# Patient Record
Sex: MALE | Employment: UNEMPLOYED | URBAN - METROPOLITAN AREA
[De-identification: names, ages, dates, MRNs, and addresses within clinical notes are randomized per-mention and may not be internally consistent; named-entity substitution may affect disease eponyms.]

---
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
## 2025-05-27 NOTE — PROGRESS NOTES
History: Martin is here for his right knee. He feels that he injured the knee in January while wrestling when his knee got twisted awkwardly. He did some home exercise and it got better. He then did a 5k last week which caused it to become painful again. It does continue to be sore and painful. He is here as a new patient today.     Past medical history: Multiple  Medications: Multiple  Allergies: No known drug allergies    Please refer to the intake H&P regarding the patient's review of systems, family history and social history as was done today    HEENT: Normal  Lungs: Clear to auscultation  Heart: RRR  Abdomen: Soft, nontender  Skin: clear  Extremity: He has full range of motion of the knee with trace crepitus.  Negative Lachman and posterior drawer.  Positive posterior Sonny's and over.  Mild laxity with varus stress.  No numbness or tingling.  Contralateral exam is normal for strength, motion, stability and neurovascular assessment.    Radiographs: Right knee x-rays today are essentially normal.      Assessment: Right knee sprain cannot rule out internal derangement    Plan: We discussed several options for treatment including medications and further imaging with a MRI for surgical planning purposes. We will put in an order for the MRI today and will see him back afterwards to discuss the results. In the meantime, he will try to avoid any activity that will aggravate the knee.    All questions were answered today with the patient.    Scribe Attestation:  By signing my name below, IMaryann Scribe attest that this documentation has been prepared under the direction and in the presence of Gaurav Raphael MD.    Provider Attestation - Scribe documentation:  All medical record entries made by Maryann Nathan were at my direction and personally dictated by me, Gaurav Raphael MD. I have reviewed the chart and agree that the record is accurate and I confirmed that it reflects my personal  performance of the history, physical exam, discussion, and plan.

## 2025-05-28 ENCOUNTER — HOSPITAL ENCOUNTER (OUTPATIENT)
Dept: RADIOLOGY | Facility: HOSPITAL | Age: 20
Discharge: HOME | End: 2025-05-28
Payer: COMMERCIAL

## 2025-05-28 ENCOUNTER — APPOINTMENT (OUTPATIENT)
Dept: ORTHOPEDIC SURGERY | Facility: CLINIC | Age: 20
End: 2025-05-28
Payer: COMMERCIAL

## 2025-05-28 DIAGNOSIS — M25.561 RIGHT KNEE PAIN, UNSPECIFIED CHRONICITY: ICD-10-CM

## 2025-05-28 DIAGNOSIS — S83.91XA SPRAIN OF RIGHT KNEE, INITIAL ENCOUNTER: Primary | ICD-10-CM

## 2025-05-28 PROCEDURE — 73564 X-RAY EXAM KNEE 4 OR MORE: CPT | Mod: RT

## 2025-05-28 PROCEDURE — 1036F TOBACCO NON-USER: CPT | Performed by: ORTHOPAEDIC SURGERY

## 2025-05-28 PROCEDURE — 73564 X-RAY EXAM KNEE 4 OR MORE: CPT | Mod: RIGHT SIDE | Performed by: RADIOLOGY

## 2025-05-28 PROCEDURE — 99204 OFFICE O/P NEW MOD 45 MIN: CPT | Performed by: ORTHOPAEDIC SURGERY

## 2025-06-18 ENCOUNTER — APPOINTMENT (OUTPATIENT)
Dept: RADIOLOGY | Facility: HOSPITAL | Age: 20
End: 2025-06-18
Payer: COMMERCIAL

## 2025-06-18 DIAGNOSIS — S83.91XA SPRAIN OF RIGHT KNEE, INITIAL ENCOUNTER: ICD-10-CM

## 2025-06-18 PROCEDURE — 73721 MRI JNT OF LWR EXTRE W/O DYE: CPT | Mod: RT

## 2025-07-24 ENCOUNTER — OFFICE VISIT (OUTPATIENT)
Dept: ORTHOPEDIC SURGERY | Facility: CLINIC | Age: 20
End: 2025-07-24
Payer: COMMERCIAL

## 2025-07-24 ENCOUNTER — ANCILLARY PROCEDURE (OUTPATIENT)
Dept: ORTHOPEDIC SURGERY | Facility: CLINIC | Age: 20
End: 2025-07-24
Payer: COMMERCIAL

## 2025-07-24 DIAGNOSIS — M54.10 BACK PAIN WITH RADICULOPATHY: ICD-10-CM

## 2025-07-24 DIAGNOSIS — M54.10 BACK PAIN WITH RADICULOPATHY: Primary | ICD-10-CM

## 2025-07-24 PROCEDURE — 99214 OFFICE O/P EST MOD 30 MIN: CPT | Performed by: PHYSICIAN ASSISTANT

## 2025-07-24 PROCEDURE — 1036F TOBACCO NON-USER: CPT | Performed by: PHYSICIAN ASSISTANT

## 2025-07-24 RX ORDER — CYCLOBENZAPRINE HCL 10 MG
10 TABLET ORAL 3 TIMES DAILY PRN
Qty: 30 TABLET | Refills: 0 | Status: SHIPPED | OUTPATIENT
Start: 2025-07-24 | End: 2025-08-03

## 2025-07-24 RX ORDER — PREDNISONE 10 MG/1
TABLET ORAL
Qty: 30 TABLET | Refills: 0 | Status: SHIPPED | OUTPATIENT
Start: 2025-07-24

## 2025-07-24 NOTE — PROGRESS NOTES
New patient established to the practice comes the office complaining of low back pain.  It started while he was working out lifting weights.  Doing a dead lift.  And then he had an episode where he got some right leg sciatica symptoms.  Not really at now.  Denies bowel or bladder complaints denies saddle anesthesia denies gait or balance changes.  Denies any spine surgery.    Look at patient's primary doctors note check medication list is not on any meds to cause any muscular aches and pains.  No major blood thinners.    Physical exam: Well-nourished, well kept.  No lymphangitis or lymphadenopathy in the examined extremities.  Good perfusion to the extremities ×4.  Radial and dorsalis pedis pulses 2+.  Capillary refill to all 4 digits brisk.  No distal edema x 4.  Ambulating with no major difficulty.  Full range of motion cervical spine and observation no instability.  Decreased range of motion flexion-extension rotation lumbar spine good strength no instability.  Patient moving upper extremities by difficulty motor strength intact.  Examination of the lower extremities reveals no point tenderness, swelling, or deformity.  Range of motion of the hips, knees, and ankles are full without crepitance, instability, or exacerbation of pain.  Strength is 5/5 throughout.  No redness, abrasions, or lesions on all 4 extremities, head and neck, or trunk.  Patient neurologically intact    X-ray: X-ray lumbar spine taken today and reviewed shows very minor degenerative changes.  Nothing bad though no fractures dislocations or bony lytic lesions noted no instability on flexion-extension.    Assessment: Is a patient probably with some lumbar sprain strain I think.  I talked about the possibility maybe it was stress reaction.  But I told him we can start with conservative treatment.    Plan: Will get the patient into physical therapy here in [place].  And we will try some steroid muscle relaxants.  If he is no better and the pain  is lingering on or getting worse with therapy let me know and I will get an MRI of the lumbar spine to rule out a stress fracture

## 2025-07-30 NOTE — PROGRESS NOTES
"Physical Therapy    Physical Therapy Evaluation and Treatment      Patient Name: Martin Baker  MRN: [id number]  Today's Date: 7/31/2025    Time Entry:   Time Calculation  Start Time: 0719  Stop Time: 0802  Time Calculation (min): 43 min  PT Evaluation Time Entry  PT Evaluation (Low) Time Entry: 30  PT Therapeutic Procedures Time Entry  Therapeutic Exercise Time Entry: 13                   Assessment:  PT Assessment  PT Assessment Results: Decreased strength, Decreased range of motion, Decreased endurance, Decreased mobility, Decreased coordination, Impaired balance, Pain  Rehab Prognosis: Good     low complexity, stable    Patient is a 19 year old male that presents to physical therapy evaluation today due to complaints of low back pain. Pt reports he initially injured it when dead lifting.  Pt has the presence of 1 personal factors and/or comorbidities that impact the plan of care including chronicity of symptoms.    Patient demonstrates a decline in their functional mobility secondary to pain,flexibility deficits of the hamstring, strength deficits in core and pelvic musculature, and motor control deficits including SL stance  with poor intrinsic foot control, over-pronation, tibial internal rotation with knee valgus collapse in 8\" step down tests.   Due to these impairments, the patients has the following functional limitations: pain with dead lifting, difficulty participating in higher level sport activities such as running, jumping and cutting. Pt will benefit from continued skilled therapy to address their impairments and progress towards the associated functional goals.    Plan:  OP PT Plan  Treatment/Interventions: Aquatic therapy, Biofeedback, Blood flow restriction therapy, Cryotherapy, Dry needling, Education/ Instruction, Electrical stimulation, Gait training, Hot pack, Manual therapy, Neuromuscular re-education, Mechanical traction, Self care/ home management, Taping techniques, Therapeutic " "activities, Therapeutic exercises, Ultrasound, Vasopneumatic device  PT Plan: Skilled PT  PT Frequency: 1 time per week  Duration: 1-2x a week for 6 more visits  Number of Treatments Authorized: BMN  Rehab Potential: Good  Plan of Care Agreement: Patient    Current Problem:   1. Acute back pain with radiculopathy  Follow Up In Physical Therapy      2. Weakness of both hips  Follow Up In Physical Therapy      3. Decreased ROM of lumbar spine  Follow Up In Physical Therapy          Insurance   [employer] BMN PT OT COPAY 0 DED (MET)  COVERAGE 100 OOP 1000(40) 3000(480) NO AUTH REQ  REF# [id number]   Visit: 1     Subjective      Pt reports he was doing a dead lift while working out and had an episode where he got right leg sciatica type symptoms. He reports this was in spring.   Pt saw Efren Samuels on 7/24/25 and he prescribed him some steriod and muscle relaxants to try in addition to physical therapy.     He feels like he had gotten to the point were he could still try to workout but wasn't able to lift heavier weights. He reports he enjoys playing soccer and basketball and notices when jumping/cutting activities will aggravate his back.     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     Pt reports he typically works out on his own ans splits up his workouts by body part.     Pt denies any numbness/tingling in the legs currently.     Pain:     Location: lower back   Description: sharp pain across lower back, maybe leaning towards the right side  Worst: 5/10  Best: 0/10  Current: 0/10  Aggravating Factors:  cutting, jumping, heavier lifts  Relieving Factors:  heating pad    Pt goals: \" get back to dead lifting again\"     Relevant Information (PMH & Previous Tests/Imaging):     PMH: unremarkable    Imaging:   X-rays:FINDINGS:  AP lateral flexion extension x-rays of the lumbar spine shows minimal  degenerative changes with some endplate irregularity. There is no  spondylolisthesis. There " is no spondylolysis. There is no fractures.  Lumbar lordosis is maintained. Range of motion with flexion extension  is preserved. There is no scoliosis. Pedicles are visualized at all  levels. Bony pelvis and hips are partially visualized and are normal.     Previous Interventions/Treatments: n/a    Red Flags: Do you have any of the following? NO  Fever/chills, unexplained weight changes, dizziness/fainting, unexplained change in bowel or bladder functions, unexplained malaise or muscle weakness, night pain/sweats, numbness or tingling  Signs of DVT including: Pain, swelling or tenderness to calf, warm or red skin, previous history of DVT    General:  General  Reason for Referral: low back  Referred By: Efren Samuels  Precautions:  N/a     Pain:  Pain Assessment  Pain Assessment: 0-10  0-10 (Numeric) Pain Score: 0 - No pain  Pain Type: Chronic pain  Pain Location: Back  Pain Orientation: Lower  Home Living:   Pt is a college student, apartment 12 steps to enter handrail present   Prior Level of Function:   Pt previously independent with all ADLs    Objective     Spine Musculoskeletal Exam    Gait    Gait is normal.    Palpation    Thoracolumbar    Tenderness: none    Right      Spasms: none      Muscle tone: normal    Left      Spasms: none      Muscle tone: normal    Range of Motion    Thoracolumbar       Flexion: 75%. Flexion comment: 1/10, mostly tightness in lower back and hamstring. Flexion detail: pain.     Extension: normal. Extension detail: no pain.       Right      Lateral bending: normal. Lateral bending detail: no pain.       Lateral rotation: normal. Lateral rotation detail: no pain.       Left      Lateral bending: normal. Lateral bending detail: no pain.       Lateral rotation: normal. Lateral rotation detail: no pain.      Strength    Thoracolumbar       Right      Tibialis anterior: 5/5.       Plantar flexion: 5/5.       Quadriceps: 5/5.       Hamstrin/5.       Hip abductors: 4-/5.       Hip  "flexion: 4-/5. Hip flexion is not affected by pain.        Left      Tibialis anterior: 5/5.       Plantar flexion: 5/5.       Quadriceps: 5/5.       Hamstrin/5.       Hip abductors: 4-/5.       Hip flexion: 4-/5. Hip flexion is not affected by pain.      Special Tests    Thoracolumbar      Right      AUTUMN test: negative      SLR: no back or leg pain      Left      AUTUMN test: negative      SLR: no back or leg pain    Hip Musculoskeletal Exam  Gait    Gait is normal.    Range of Motion    Right      Right hip range of motion is within functional limits.       Passive ROM comment: some tightness in hip IR.     Left      Left hip range of motion is within functional limits.       Passive ROM comment: some tightness in hip IR.     Strength    Right      Extension: 4-/5. Extension is not affected by pain.       Flexion: 4-/5. Flexion is not affected by pain.     Left      Extension: 4-/5. Extension is not affected by pain.       Flexion: 4-/5. Flexion is not affected by pain.     Special Tests    Right      AUTUMN test (right): negative    Left      AUTUMN test (left): negative       DL bridge test: bilateral pelvic drop noted and shaking in hamstrings     8\" lateral clarke down test: bilateral Poor intrinsic foot control, over-pronation, tibial internal rotation with knee valgus collapse    Forearm plank hold: able to sustain for 30 seconds, no increase in pain     DL squat: forward trunk lean, decreased ankle DF ROM noted by bilaterally heel lift at end range- no increase in pain    Outcome Measures:  Other Measures  Oswestry Disablity Index (ENDER):      Treatments:  Therapeutic Exercise  Therapeutic Exercise Performed: Yes  Therapeutic Exercise Activity 1: sidelying clamshells green x10  Therapeutic Exercise Activity 2: DL bridge x10 green band at knees  Therapeutic Exercise Activity 3: side plank holds x1 30\" each side  Therapeutic Exercise Activity 4: bird dog progressions x5 5\" hold    EDUCATION:  Outpatient " Education  Individual(s) Educated: Patient  Education Provided: Anatomy, Body Mechanics, Home Exercise Program, POC, Physiology  Risk and Benefits Discussed with Patient/Caregiver/Other: yes  Patient/Caregiver Demonstrated Understanding: yes  Plan of Care Discussed and Agreed Upon: yes  Patient Response to Education: Patient/Caregiver Verbalized Understanding of Information  The patient was educated on: the importance of positioning, proper posture, and body mechanics, joint mechanics and pathology, general tissue healing time, the appropriate use of heat and cold to control pain and inflammation, the importance of general therapeutic exercise, especially to stay within pain-free ROM, specific anatomy, function, & regional interdependence of involved areas, & likely cause of impairments & POC. Pt's questions were answered to their satisfaction, & pt. verbalized understanding & agreement with POC    Access Code: [email or web address]  URL: [email or web address]  Date: 07/31/2025  Prepared by: Iqra Ga    Goals:  Pain: Pt to report pain at worst 1/10 in order to improve functional capacity.   Range Of Motion/Joint Mobility: Lumbar AROM WFL's and pain free.  Strength: 4+-5/5 gross strength throughout lumbopelvic musculature in order to improve function for leisure / recreation skills.   No pain or difficulty with their basic and instrumental ADL's.   Pt to be able to participating in dead lifting activities with gradual increase in weight without increase in symptoms

## 2025-07-31 ENCOUNTER — APPOINTMENT (OUTPATIENT)
Dept: PHYSICAL THERAPY | Facility: CLINIC | Age: 20
End: 2025-07-31
Payer: COMMERCIAL

## 2025-07-31 DIAGNOSIS — R29.898 WEAKNESS OF BOTH HIPS: ICD-10-CM

## 2025-07-31 DIAGNOSIS — M54.10 ACUTE BACK PAIN WITH RADICULOPATHY: Primary | ICD-10-CM

## 2025-07-31 DIAGNOSIS — M53.86 DECREASED ROM OF LUMBAR SPINE: ICD-10-CM

## 2025-07-31 PROCEDURE — 97110 THERAPEUTIC EXERCISES: CPT | Performed by: PHYSICAL THERAPIST

## 2025-07-31 PROCEDURE — 97161 PT EVAL LOW COMPLEX 20 MIN: CPT | Performed by: PHYSICAL THERAPIST

## 2025-07-31 ASSESSMENT — ENCOUNTER SYMPTOMS
DEPRESSION: 0
OCCASIONAL FEELINGS OF UNSTEADINESS: 0
LOSS OF SENSATION IN FEET: 0

## 2025-07-31 ASSESSMENT — PAIN - FUNCTIONAL ASSESSMENT: PAIN_FUNCTIONAL_ASSESSMENT: 0-10

## 2025-07-31 ASSESSMENT — PAIN SCALES - GENERAL: PAINLEVEL_OUTOF10: 0 - NO PAIN

## 2025-07-31 NOTE — PATIENT INSTRUCTIONS
Access Code: [email or web address]  URL: [email or web address]  Date: 07/31/2025  Prepared by: Iqar Ga    Exercises  - Clamshell with Resistance  - 1 x daily - 7 x weekly - 3 sets - 10 reps - 2 hold  - Supine Bridge with Resistance Band  - 1 x daily - 7 x weekly - 3 sets - 10 reps - 3 hold  - Side Plank on Knees  - 1 x daily - 7 x weekly - 3 sets - 30sec to 1 min hold  - Bird Dog  - 1 x daily - 7 x weekly - 2 sets - 10 reps - 5 hold

## 2025-08-05 ENCOUNTER — HOSPITAL ENCOUNTER (EMERGENCY)
Age: 20
Discharge: HOME | End: 2025-08-05
Attending: EMERGENCY MEDICINE
Payer: COMMERCIAL

## 2025-08-05 ENCOUNTER — OFFICE VISIT (OUTPATIENT)
Dept: FAMILY MEDICINE CLINIC | Age: 20
End: 2025-08-05
Payer: COMMERCIAL

## 2025-08-05 VITALS
BODY MASS INDEX: 24.39 KG/M2 | DIASTOLIC BLOOD PRESSURE: 64 MMHG | WEIGHT: 170.4 LBS | SYSTOLIC BLOOD PRESSURE: 112 MMHG | OXYGEN SATURATION: 98 % | TEMPERATURE: 97.9 F | HEART RATE: 73 BPM | RESPIRATION RATE: 16 BRPM | HEIGHT: 70 IN

## 2025-08-05 VITALS
SYSTOLIC BLOOD PRESSURE: 132 MMHG | TEMPERATURE: 97.9 F | OXYGEN SATURATION: 97 % | RESPIRATION RATE: 20 BRPM | HEART RATE: 66 BPM | WEIGHT: 170 LBS | DIASTOLIC BLOOD PRESSURE: 64 MMHG | BODY MASS INDEX: 24.34 KG/M2 | HEIGHT: 70 IN

## 2025-08-05 DIAGNOSIS — L25.9 CONTACT DERMATITIS, UNSPECIFIED CONTACT DERMATITIS TYPE, UNSPECIFIED TRIGGER: Primary | ICD-10-CM

## 2025-08-05 DIAGNOSIS — R21 RASH: Primary | ICD-10-CM

## 2025-08-05 LAB
ANION GAP SERPL CALCULATED.3IONS-SCNC: 12 MEQ/L (ref 9–15)
BASOPHILS # BLD: 0.1 K/UL (ref 0–0.1)
BASOPHILS NFR BLD: 0.8 % (ref 0.2–1.2)
BUN SERPL-MCNC: 18 MG/DL (ref 6–20)
CALCIUM SERPL-MCNC: 9.6 MG/DL (ref 8.5–9.9)
CHLORIDE SERPL-SCNC: 103 MEQ/L (ref 95–107)
CO2 SERPL-SCNC: 24 MEQ/L (ref 20–31)
CREAT SERPL-MCNC: 0.9 MG/DL (ref 0.7–1.2)
EOSINOPHIL # BLD: 0.9 K/UL (ref 0–0.5)
EOSINOPHIL NFR BLD: 12.2 % (ref 0.8–7)
ERYTHROCYTE [DISTWIDTH] IN BLOOD BY AUTOMATED COUNT: 12.9 % (ref 11.6–14.4)
GLUCOSE SERPL-MCNC: 71 MG/DL (ref 70–99)
HCT VFR BLD AUTO: 47.9 % (ref 42–52)
HGB BLD-MCNC: 16.4 G/DL (ref 13.7–17.5)
IMM GRANULOCYTES # BLD: 0 K/UL
IMM GRANULOCYTES NFR BLD: 0.1 %
LYMPHOCYTES # BLD: 1.7 K/UL (ref 1.3–3.6)
LYMPHOCYTES NFR BLD: 23 %
MCH RBC QN AUTO: 30.4 PG (ref 25.7–32.2)
MCHC RBC AUTO-ENTMCNC: 34.2 % (ref 32.3–36.5)
MCV RBC AUTO: 88.7 FL (ref 79–92.2)
MONOCYTES # BLD: 0.6 K/UL (ref 0.3–0.8)
MONOCYTES NFR BLD: 7.7 % (ref 5.3–12.2)
NEUTROPHILS # BLD: 4.2 K/UL (ref 1.8–5.4)
NEUTS SEG NFR BLD: 56.2 % (ref 34–67.9)
PLATELET # BLD AUTO: 221 K/UL (ref 163–337)
POTASSIUM SERPL-SCNC: 4.5 MEQ/L (ref 3.4–4.9)
RBC # BLD AUTO: 5.4 M/UL (ref 4.63–6.08)
SODIUM SERPL-SCNC: 139 MEQ/L (ref 135–144)
WBC # BLD AUTO: 7.4 K/UL (ref 4.2–9)

## 2025-08-05 PROCEDURE — 87040 BLOOD CULTURE FOR BACTERIA: CPT

## 2025-08-05 PROCEDURE — 80048 BASIC METABOLIC PNL TOTAL CA: CPT

## 2025-08-05 PROCEDURE — 99283 EMERGENCY DEPT VISIT LOW MDM: CPT

## 2025-08-05 PROCEDURE — 6370000000 HC RX 637 (ALT 250 FOR IP): Performed by: EMERGENCY MEDICINE

## 2025-08-05 PROCEDURE — 85025 COMPLETE CBC W/AUTO DIFF WBC: CPT

## 2025-08-05 PROCEDURE — 99202 OFFICE O/P NEW SF 15 MIN: CPT

## 2025-08-05 PROCEDURE — 36415 COLL VENOUS BLD VENIPUNCTURE: CPT

## 2025-08-05 RX ORDER — PREDNISONE 20 MG/1
40 TABLET ORAL DAILY
Qty: 8 TABLET | Refills: 0 | Status: SHIPPED | OUTPATIENT
Start: 2025-08-05 | End: 2025-08-09

## 2025-08-05 RX ORDER — SULFAMETHOXAZOLE AND TRIMETHOPRIM 800; 160 MG/1; MG/1
TABLET ORAL
COMMUNITY
Start: 2025-07-31

## 2025-08-05 RX ORDER — CEPHALEXIN 500 MG/1
500 CAPSULE ORAL 4 TIMES DAILY
Qty: 28 CAPSULE | Refills: 0 | Status: SHIPPED | OUTPATIENT
Start: 2025-08-05 | End: 2025-08-12

## 2025-08-05 RX ORDER — PREDNISONE 20 MG/1
40 TABLET ORAL ONCE
Status: COMPLETED | OUTPATIENT
Start: 2025-08-05 | End: 2025-08-05

## 2025-08-05 RX ORDER — CEPHALEXIN 500 MG/1
500 CAPSULE ORAL ONCE
Status: COMPLETED | OUTPATIENT
Start: 2025-08-05 | End: 2025-08-05

## 2025-08-05 RX ADMIN — CEPHALEXIN 500 MG: 500 CAPSULE ORAL at 13:46

## 2025-08-05 RX ADMIN — PREDNISONE 40 MG: 20 TABLET ORAL at 13:46

## 2025-08-05 SDOH — ECONOMIC STABILITY: FOOD INSECURITY: WITHIN THE PAST 12 MONTHS, THE FOOD YOU BOUGHT JUST DIDN'T LAST AND YOU DIDN'T HAVE MONEY TO GET MORE.: NEVER TRUE

## 2025-08-05 SDOH — ECONOMIC STABILITY: FOOD INSECURITY: WITHIN THE PAST 12 MONTHS, YOU WORRIED THAT YOUR FOOD WOULD RUN OUT BEFORE YOU GOT MONEY TO BUY MORE.: NEVER TRUE

## 2025-08-05 ASSESSMENT — ENCOUNTER SYMPTOMS
VOMITING: 0
ABDOMINAL PAIN: 0
RHINORRHEA: 0
SHORTNESS OF BREATH: 0
NAUSEA: 0
FACIAL SWELLING: 0
SHORTNESS OF BREATH: 0
SORE THROAT: 0
SORE THROAT: 0
WHEEZING: 0
COUGH: 0
EYE PAIN: 0
COUGH: 0
DIARRHEA: 0
EYE DISCHARGE: 0
COLOR CHANGE: 1
ABDOMINAL PAIN: 0
EYE REDNESS: 0
VOMITING: 0
SINUS PRESSURE: 0
NAIL CHANGES: 0
NAUSEA: 0
BACK PAIN: 0
CHEST TIGHTNESS: 0
ABDOMINAL DISTENTION: 0
SINUS PAIN: 0

## 2025-08-05 ASSESSMENT — PATIENT HEALTH QUESTIONNAIRE - PHQ9
1. LITTLE INTEREST OR PLEASURE IN DOING THINGS: NOT AT ALL
SUM OF ALL RESPONSES TO PHQ QUESTIONS 1-9: 0
SUM OF ALL RESPONSES TO PHQ QUESTIONS 1-9: 0
2. FEELING DOWN, DEPRESSED OR HOPELESS: NOT AT ALL
SUM OF ALL RESPONSES TO PHQ QUESTIONS 1-9: 0
SUM OF ALL RESPONSES TO PHQ QUESTIONS 1-9: 0

## 2025-08-05 ASSESSMENT — PAIN - FUNCTIONAL ASSESSMENT
PAIN_FUNCTIONAL_ASSESSMENT: NONE - DENIES PAIN
PAIN_FUNCTIONAL_ASSESSMENT: NONE - DENIES PAIN

## 2025-08-05 ASSESSMENT — LIFESTYLE VARIABLES
HOW MANY STANDARD DRINKS CONTAINING ALCOHOL DO YOU HAVE ON A TYPICAL DAY: 1 OR 2
HOW OFTEN DO YOU HAVE A DRINK CONTAINING ALCOHOL: MONTHLY OR LESS

## 2025-08-07 ENCOUNTER — APPOINTMENT (OUTPATIENT)
Dept: PHYSICAL THERAPY | Facility: CLINIC | Age: 20
End: 2025-08-07
Payer: COMMERCIAL

## 2025-08-10 LAB — BACTERIA BLD CULT: NORMAL

## 2025-08-12 ENCOUNTER — APPOINTMENT (OUTPATIENT)
Dept: PHYSICAL THERAPY | Facility: CLINIC | Age: 20
End: 2025-08-12
Payer: COMMERCIAL

## 2025-08-14 ENCOUNTER — APPOINTMENT (OUTPATIENT)
Dept: PHYSICAL THERAPY | Facility: CLINIC | Age: 20
End: 2025-08-14
Payer: COMMERCIAL

## 2025-08-14 DIAGNOSIS — R29.898 WEAKNESS OF BOTH HIPS: ICD-10-CM

## 2025-08-14 DIAGNOSIS — M53.86 DECREASED ROM OF LUMBAR SPINE: ICD-10-CM

## 2025-08-14 DIAGNOSIS — M54.10 ACUTE BACK PAIN WITH RADICULOPATHY: ICD-10-CM

## 2025-08-14 PROCEDURE — 97110 THERAPEUTIC EXERCISES: CPT | Performed by: PHYSICAL THERAPIST

## 2025-08-14 ASSESSMENT — PAIN - FUNCTIONAL ASSESSMENT: PAIN_FUNCTIONAL_ASSESSMENT: 0-10

## 2025-08-14 ASSESSMENT — PAIN SCALES - GENERAL: PAINLEVEL_OUTOF10: 0 - NO PAIN

## 2025-08-18 ENCOUNTER — APPOINTMENT (OUTPATIENT)
Dept: PHYSICAL THERAPY | Facility: CLINIC | Age: 20
End: 2025-08-18
Payer: COMMERCIAL

## 2025-08-18 DIAGNOSIS — M54.10 ACUTE BACK PAIN WITH RADICULOPATHY: ICD-10-CM

## 2025-08-18 DIAGNOSIS — R29.898 WEAKNESS OF BOTH HIPS: ICD-10-CM

## 2025-08-18 DIAGNOSIS — M53.86 DECREASED ROM OF LUMBAR SPINE: ICD-10-CM

## 2025-08-18 PROCEDURE — 97110 THERAPEUTIC EXERCISES: CPT | Performed by: PHYSICAL THERAPIST

## 2025-08-18 ASSESSMENT — PAIN SCALES - GENERAL: PAINLEVEL_OUTOF10: 0 - NO PAIN

## 2025-08-18 ASSESSMENT — PAIN - FUNCTIONAL ASSESSMENT: PAIN_FUNCTIONAL_ASSESSMENT: 0-10

## 2025-08-21 ENCOUNTER — APPOINTMENT (OUTPATIENT)
Dept: PHYSICAL THERAPY | Facility: CLINIC | Age: 20
End: 2025-08-21
Payer: COMMERCIAL

## 2025-08-21 DIAGNOSIS — M54.10 ACUTE BACK PAIN WITH RADICULOPATHY: ICD-10-CM

## 2025-08-21 DIAGNOSIS — M53.86 DECREASED ROM OF LUMBAR SPINE: ICD-10-CM

## 2025-08-21 DIAGNOSIS — R29.898 WEAKNESS OF BOTH HIPS: ICD-10-CM

## 2025-08-21 PROCEDURE — 97110 THERAPEUTIC EXERCISES: CPT | Performed by: PHYSICAL THERAPIST

## 2025-08-21 ASSESSMENT — PAIN SCALES - GENERAL: PAINLEVEL_OUTOF10: 0 - NO PAIN

## 2025-08-21 ASSESSMENT — PAIN - FUNCTIONAL ASSESSMENT: PAIN_FUNCTIONAL_ASSESSMENT: 0-10

## 2025-08-25 ENCOUNTER — APPOINTMENT (OUTPATIENT)
Dept: PHYSICAL THERAPY | Facility: CLINIC | Age: 20
End: 2025-08-25
Payer: COMMERCIAL

## 2025-09-03 ENCOUNTER — APPOINTMENT (OUTPATIENT)
Dept: PHYSICAL THERAPY | Facility: CLINIC | Age: 20
End: 2025-09-03
Payer: COMMERCIAL

## 2025-09-03 ASSESSMENT — PAIN SCALES - GENERAL: PAINLEVEL_OUTOF10: 0 - NO PAIN

## 2025-09-03 ASSESSMENT — PAIN - FUNCTIONAL ASSESSMENT: PAIN_FUNCTIONAL_ASSESSMENT: 0-10

## 2025-09-17 ENCOUNTER — APPOINTMENT (OUTPATIENT)
Dept: PHYSICAL THERAPY | Facility: CLINIC | Age: 20
End: 2025-09-17
Payer: COMMERCIAL